# Patient Record
Sex: MALE | Race: OTHER | HISPANIC OR LATINO | URBAN - METROPOLITAN AREA
[De-identification: names, ages, dates, MRNs, and addresses within clinical notes are randomized per-mention and may not be internally consistent; named-entity substitution may affect disease eponyms.]

---

## 2017-11-19 ENCOUNTER — EMERGENCY (EMERGENCY)
Facility: HOSPITAL | Age: 27
LOS: 1 days | Discharge: HOME | End: 2017-11-19
Attending: EMERGENCY MEDICINE | Admitting: EMERGENCY MEDICINE
Payer: SELF-PAY

## 2017-11-19 VITALS
TEMPERATURE: 99 F | SYSTOLIC BLOOD PRESSURE: 127 MMHG | OXYGEN SATURATION: 100 % | HEIGHT: 66 IN | RESPIRATION RATE: 20 BRPM | HEART RATE: 90 BPM | WEIGHT: 169.98 LBS | DIASTOLIC BLOOD PRESSURE: 91 MMHG

## 2017-11-19 PROCEDURE — 99053 MED SERV 10PM-8AM 24 HR FAC: CPT

## 2017-11-19 PROCEDURE — 99284 EMERGENCY DEPT VISIT MOD MDM: CPT | Mod: 25

## 2017-11-19 NOTE — ED ADULT TRIAGE NOTE - CHIEF COMPLAINT QUOTE
Patient presents to ER complaining of chest pain since 1700, reports symptoms X 3 months, reports mild SOB, no PMH.

## 2017-11-20 VITALS
SYSTOLIC BLOOD PRESSURE: 114 MMHG | DIASTOLIC BLOOD PRESSURE: 68 MMHG | OXYGEN SATURATION: 99 % | HEART RATE: 74 BPM | TEMPERATURE: 98 F | RESPIRATION RATE: 20 BRPM

## 2017-11-20 LAB
ALBUMIN SERPL ELPH-MCNC: 4.4 G/DL — SIGNIFICANT CHANGE UP (ref 3.3–5.2)
ALP SERPL-CCNC: 76 U/L — SIGNIFICANT CHANGE UP (ref 40–120)
ALT FLD-CCNC: 59 U/L — HIGH
ANION GAP SERPL CALC-SCNC: 14 MMOL/L — SIGNIFICANT CHANGE UP (ref 5–17)
APPEARANCE UR: CLEAR — SIGNIFICANT CHANGE UP
AST SERPL-CCNC: 36 U/L — SIGNIFICANT CHANGE UP
BASOPHILS # BLD AUTO: 0 K/UL — SIGNIFICANT CHANGE UP (ref 0–0.2)
BASOPHILS NFR BLD AUTO: 0.1 % — SIGNIFICANT CHANGE UP (ref 0–2)
BILIRUB SERPL-MCNC: 0.2 MG/DL — LOW (ref 0.4–2)
BILIRUB UR-MCNC: NEGATIVE — SIGNIFICANT CHANGE UP
BUN SERPL-MCNC: 15 MG/DL — SIGNIFICANT CHANGE UP (ref 8–20)
CALCIUM SERPL-MCNC: 9.2 MG/DL — SIGNIFICANT CHANGE UP (ref 8.6–10.2)
CHLORIDE SERPL-SCNC: 104 MMOL/L — SIGNIFICANT CHANGE UP (ref 98–107)
CO2 SERPL-SCNC: 23 MMOL/L — SIGNIFICANT CHANGE UP (ref 22–29)
COLOR SPEC: YELLOW — SIGNIFICANT CHANGE UP
CREAT SERPL-MCNC: 0.89 MG/DL — SIGNIFICANT CHANGE UP (ref 0.5–1.3)
DIFF PNL FLD: NEGATIVE — SIGNIFICANT CHANGE UP
EOSINOPHIL # BLD AUTO: 0.3 K/UL — SIGNIFICANT CHANGE UP (ref 0–0.5)
EOSINOPHIL NFR BLD AUTO: 3.1 % — SIGNIFICANT CHANGE UP (ref 0–5)
GLUCOSE SERPL-MCNC: 105 MG/DL — SIGNIFICANT CHANGE UP (ref 70–115)
GLUCOSE UR QL: NEGATIVE MG/DL — SIGNIFICANT CHANGE UP
HCT VFR BLD CALC: 43.2 % — SIGNIFICANT CHANGE UP (ref 42–52)
HGB BLD-MCNC: 15.1 G/DL — SIGNIFICANT CHANGE UP (ref 14–18)
KETONES UR-MCNC: NEGATIVE — SIGNIFICANT CHANGE UP
LEUKOCYTE ESTERASE UR-ACNC: NEGATIVE — SIGNIFICANT CHANGE UP
LIDOCAIN IGE QN: 48 U/L — SIGNIFICANT CHANGE UP (ref 22–51)
LYMPHOCYTES # BLD AUTO: 1.8 K/UL — SIGNIFICANT CHANGE UP (ref 1–4.8)
LYMPHOCYTES # BLD AUTO: 21.1 % — SIGNIFICANT CHANGE UP (ref 20–55)
MCHC RBC-ENTMCNC: 30.3 PG — SIGNIFICANT CHANGE UP (ref 27–31)
MCHC RBC-ENTMCNC: 35 G/DL — SIGNIFICANT CHANGE UP (ref 32–36)
MCV RBC AUTO: 86.6 FL — SIGNIFICANT CHANGE UP (ref 80–94)
MONOCYTES # BLD AUTO: 0.6 K/UL — SIGNIFICANT CHANGE UP (ref 0–0.8)
MONOCYTES NFR BLD AUTO: 7.3 % — SIGNIFICANT CHANGE UP (ref 3–10)
NEUTROPHILS # BLD AUTO: 5.7 K/UL — SIGNIFICANT CHANGE UP (ref 1.8–8)
NEUTROPHILS NFR BLD AUTO: 68.2 % — SIGNIFICANT CHANGE UP (ref 37–73)
NITRITE UR-MCNC: NEGATIVE — SIGNIFICANT CHANGE UP
PH UR: 7 — SIGNIFICANT CHANGE UP (ref 5–8)
PLATELET # BLD AUTO: 141 K/UL — LOW (ref 150–400)
POTASSIUM SERPL-MCNC: 4.1 MMOL/L — SIGNIFICANT CHANGE UP (ref 3.5–5.3)
POTASSIUM SERPL-SCNC: 4.1 MMOL/L — SIGNIFICANT CHANGE UP (ref 3.5–5.3)
PROT SERPL-MCNC: 7.5 G/DL — SIGNIFICANT CHANGE UP (ref 6.6–8.7)
PROT UR-MCNC: NEGATIVE MG/DL — SIGNIFICANT CHANGE UP
RBC # BLD: 4.99 M/UL — SIGNIFICANT CHANGE UP (ref 4.6–6.2)
RBC # FLD: 12.7 % — SIGNIFICANT CHANGE UP (ref 11–15.6)
SODIUM SERPL-SCNC: 141 MMOL/L — SIGNIFICANT CHANGE UP (ref 135–145)
SP GR SPEC: 1.01 — SIGNIFICANT CHANGE UP (ref 1.01–1.02)
TROPONIN T SERPL-MCNC: <0.01 NG/ML — SIGNIFICANT CHANGE UP (ref 0–0.06)
UROBILINOGEN FLD QL: NEGATIVE MG/DL — SIGNIFICANT CHANGE UP
WBC # BLD: 8.4 K/UL — SIGNIFICANT CHANGE UP (ref 4.8–10.8)
WBC # FLD AUTO: 8.4 K/UL — SIGNIFICANT CHANGE UP (ref 4.8–10.8)

## 2017-11-20 PROCEDURE — 71046 X-RAY EXAM CHEST 2 VIEWS: CPT

## 2017-11-20 PROCEDURE — T1013: CPT

## 2017-11-20 PROCEDURE — 81003 URINALYSIS AUTO W/O SCOPE: CPT

## 2017-11-20 PROCEDURE — 83690 ASSAY OF LIPASE: CPT

## 2017-11-20 PROCEDURE — 36415 COLL VENOUS BLD VENIPUNCTURE: CPT

## 2017-11-20 PROCEDURE — 71020: CPT | Mod: 26

## 2017-11-20 PROCEDURE — 84484 ASSAY OF TROPONIN QUANT: CPT

## 2017-11-20 PROCEDURE — 96374 THER/PROPH/DIAG INJ IV PUSH: CPT

## 2017-11-20 PROCEDURE — 80053 COMPREHEN METABOLIC PANEL: CPT

## 2017-11-20 PROCEDURE — 99284 EMERGENCY DEPT VISIT MOD MDM: CPT | Mod: 25

## 2017-11-20 PROCEDURE — 85027 COMPLETE CBC AUTOMATED: CPT

## 2017-11-20 RX ORDER — SODIUM CHLORIDE 9 MG/ML
3 INJECTION INTRAMUSCULAR; INTRAVENOUS; SUBCUTANEOUS ONCE
Qty: 0 | Refills: 0 | Status: COMPLETED | OUTPATIENT
Start: 2017-11-20 | End: 2017-11-20

## 2017-11-20 RX ORDER — ONDANSETRON 8 MG/1
4 TABLET, FILM COATED ORAL ONCE
Qty: 0 | Refills: 0 | Status: COMPLETED | OUTPATIENT
Start: 2017-11-20 | End: 2017-11-20

## 2017-11-20 RX ORDER — KETOROLAC TROMETHAMINE 30 MG/ML
15 SYRINGE (ML) INJECTION ONCE
Qty: 0 | Refills: 0 | Status: DISCONTINUED | OUTPATIENT
Start: 2017-11-20 | End: 2017-11-20

## 2017-11-20 RX ORDER — FAMOTIDINE 10 MG/ML
20 INJECTION INTRAVENOUS ONCE
Qty: 0 | Refills: 0 | Status: COMPLETED | OUTPATIENT
Start: 2017-11-20 | End: 2017-11-20

## 2017-11-20 RX ORDER — FAMOTIDINE 10 MG/ML
1 INJECTION INTRAVENOUS
Qty: 30 | Refills: 0 | OUTPATIENT
Start: 2017-11-20 | End: 2017-12-20

## 2017-11-20 RX ADMIN — SODIUM CHLORIDE 3 MILLILITER(S): 9 INJECTION INTRAMUSCULAR; INTRAVENOUS; SUBCUTANEOUS at 01:09

## 2017-11-20 RX ADMIN — Medication 15 MILLIGRAM(S): at 01:45

## 2017-11-20 RX ADMIN — Medication 15 MILLIGRAM(S): at 01:30

## 2017-11-20 NOTE — ED PROVIDER NOTE - OBJECTIVE STATEMENT
A 27 year old male pt presents to the ED c/o CP, vomiting, hematemesis. For the past 3 months the pt has been experiencing left sided CP. 20 days ago the pt began to experience hematemesis and this week he had 3 episodes of hematemesis, which was mostly blood according to the pt. He has not noted any blood in his stool. The pt is a smoker and has no PMHx or PSHx. No further complaints at this time.  used at bedside.

## 2017-11-20 NOTE — ED ADULT NURSE NOTE - OBJECTIVE STATEMENT
Pt receifved in [facility] c/o 3 months of chest discomfort without radiation, mild SOB, abd pain nausea and vomiting. Pt has petechiae on his face from dry heaving and blood shot eyes. Pt currently asymptomatic on assessment. Abd soft nontender to palpation, respirations are even and unlabored with no sx's of SOB noted. Pt denies medical hx. Pt denies fever/chills, diarrhea, cough, dizziness, HA.

## 2017-11-20 NOTE — ED PROVIDER NOTE - PROGRESS NOTE DETAILS
no active bleeding  feels better after gi meds  abd soft/nt  nml h/h after several weeks of symptoms

## 2018-08-17 NOTE — ED PROVIDER NOTE - ENMT, MLM
Airway patent, Nasal mucosa clear. Mouth with normal mucosa. Throat has no vesicles, no oropharyngeal exudates and uvula is midline. normal... well appearing, well nourished, and in no apparent distress.

## 2018-09-20 NOTE — ED PROVIDER NOTE - DURATION
month(s) Concentration Of Solution Injected (Mg/Ml): 4.0 Medical Necessity Clause: This procedure was medically necessary because the lesions that were treated were: Total Volume Injected (Ccs- Only Use Numbers And Decimals): 0.1 Administered By (Optional): viktoriya Consent: The risks of atrophy were reviewed with the patient. Include Z78.9 (Other Specified Conditions Influencing Health Status) As An Associated Diagnosis?: No Detail Level: Zone X Size Of Lesion In Cm (Optional): 0 Kenalog Preparation: Kenalog in bacteriostatic water

## 2020-12-29 NOTE — ED PROVIDER NOTE - MEDICAL DECISION MAKING DETAILS
A 27 year old male pt presents to the ED c/o medical evaluation. Will check labs, CXR, ekg, and re-evaluate.
94

## 2021-03-28 ENCOUNTER — OUTPATIENT (OUTPATIENT)
Dept: OUTPATIENT SERVICES | Facility: HOSPITAL | Age: 31
LOS: 1 days | End: 2021-03-28
Payer: SELF-PAY

## 2021-03-28 DIAGNOSIS — Z20.828 CONTACT WITH AND (SUSPECTED) EXPOSURE TO OTHER VIRAL COMMUNICABLE DISEASES: ICD-10-CM

## 2021-03-28 LAB — SARS-COV-2 RNA SPEC QL NAA+PROBE: SIGNIFICANT CHANGE UP

## 2021-03-28 PROCEDURE — U0003: CPT

## 2021-03-28 PROCEDURE — U0005: CPT

## 2022-10-31 ENCOUNTER — EMERGENCY (EMERGENCY)
Facility: HOSPITAL | Age: 32
LOS: 1 days | Discharge: HOME | End: 2022-10-31
Attending: EMERGENCY MEDICINE
Payer: SELF-PAY

## 2022-10-31 VITALS
HEIGHT: 66 IN | RESPIRATION RATE: 18 BRPM | TEMPERATURE: 98 F | OXYGEN SATURATION: 98 % | SYSTOLIC BLOOD PRESSURE: 122 MMHG | HEART RATE: 84 BPM | DIASTOLIC BLOOD PRESSURE: 80 MMHG

## 2022-10-31 PROCEDURE — 99283 EMERGENCY DEPT VISIT LOW MDM: CPT

## 2022-10-31 RX ORDER — OXYMETAZOLINE HYDROCHLORIDE 0.5 MG/ML
2 SPRAY NASAL EVERY 12 HOURS
Refills: 0 | Status: DISCONTINUED | OUTPATIENT
Start: 2022-10-31 | End: 2022-11-07

## 2022-10-31 RX ADMIN — OXYMETAZOLINE HYDROCHLORIDE 2 SPRAY(S): 0.5 SPRAY NASAL at 10:28

## 2022-10-31 RX ADMIN — Medication 1 TABLET(S): at 10:14

## 2022-10-31 NOTE — ED PROVIDER NOTE - NSFOLLOWUPINSTRUCTIONS_ED_ALL_ED_FT
llame y carolin un seguimiento con la clínica dental [place] y también con Ent. ada antibiótico  use el aerosol nasal solo x 3 días  Sinusitis, en adultos    Sinusitis, Adult       La sinusitis es la inflamación de los senos paranasales. Los senos paranasales son espacios vacíos en los huesos alrededor del abebe. Los senos paranasales se encuentran en estos lugares:  •Alrededor de los ojos.      •En la mitad de la frente.      •Detrás de la nariz.      •En los pómulos.      Normalmente, la mucosidad drena a través de los senos. Cuando los tejidos nasales se inflaman o hinchan, la mucosidad puede quedar atrapada o bloqueada. Granite fomenta la proliferación de bacterias, virus y hongos, lo que produce infecciones. La mayoría de las infecciones de los senos paranasales son provocadas por un virus.    La sinusitis puede desarrollarse rápidamente. Puede durar hasta 4 semanas (aguda) o más de 12 semanas (crónica). A menudo, la sinusitis surge después de un resfriado.      ¿Cuáles son las causas?    Esta afección es causada por cualquier sustancia que inflame los senos o evite que la mucosidad drene. Granite puede comprender lo siguiente:  •Alergias.      •Asma.      •Infección por bacterias o virus.      •Deformidades u obstrucciones en la nariz o los senos paranasales.      •Crecimientos anormales en la nariz (pólipos nasales).      •Agentes contaminantes, diego sustancias químicas o irritantes presentes en el aire.      •Infección por hongos (poco frecuentes).        ¿Qué incrementa el riesgo?    Es más probable que tenga esta afección si:  •Tienen debilitado el sistema de defensa del organismo (sistema inmunitario).      •Nada o bucea mucho.      •Abusa de los aerosoles nasales.      •Fuma.        ¿Cuáles son los signos o los síntomas?    Los principales síntomas de esta afección son dolor y sensación de presión alrededor de los senos paranasales afectados. Otros síntomas pueden incluir los siguientes:  •Nariz tapada o congestión nasal.      •Drenaje de mucosidad espesa que sale de la nariz.      •Hinchazón y calor en los senos paranasales afectados.      •Dolor de angelina.      •Dolor en los dientes superiores.      •Tos que puede empeorar por la noche.      •Mucosidad excesiva que se acumula en la garganta o la parte posterior de la nariz (goteo posnasal).      •Disminución del sentido del olfato y del gusto.      •Fatiga.      •Fiebre.      •Dolor de garganta.      •Mal aliento.        ¿Cómo se diagnostica?    Esta afección se diagnostica en función de lo siguiente:  •Casandra síntomas.      •Casandra antecedentes médicos.      •Un examen físico.    •Pruebas para averiguar si la afección es aguda o crónica. Estas pueden incluir:  •Revisar la nariz para bladimir si tiene pólipos nasales.      •Observar los senos paranasales con un dispositivo que tiene nikhil rommel (endoscopio).      •Hacer pruebas para detectar alergias o bacterias.      •Pruebas de diagnóstico por imágenes, diego resonancia magnética (RM) o nikhil exploración por tomografía computarizada (TC).        En contadas ocasiones, se puede realizar nikhil biopsia de hueso para descartar tipos más graves de infecciones por hongos en los senos paranasales.      ¿Cómo se trata?    El tratamiento para la sinusitis depende de la causa y de si la afección es crónica o aguda.•Si la causa es un virus, los síntomas deberían desaparecer solos en el término de 10 días. Pueden darle medicamentos para aliviar los síntomas. Entre ellos, se incluyen los siguientes:  •Medicamentos para encoger las fosas nasales hinchadas (descongestivos intranasales tópicos).       •Medicamentos para tratar alergias (antihistamínicos).      •Un aerosol que radha la inflamación de las fosas nasales (corticoesteroide intranasal tópico).      •Enjuagues que ayudan a eliminar la mucosidad espesa de la nariz (lavados con solución salina nasal).      •Si la causa son bacterias, el médico puede recomendarle que espere para bladimir si los síntomas mejoran. La mayoría de las infecciones bacterianas mejoran sin medicamentos antibióticos. Posiblemente le den antibióticos si usted tiene:  •Nikhil infección grave.       •El sistema inmunitario debilitado.        •Si la causa es un estrechamiento de las fosas nasales o la presencia de pólipos nasales, es posible que necesite nikhil cirugía.        Siga estas indicaciones en robison casa:    Medicamentos     •Readlyn, use o aplíquese los medicamentos de venta juanito y recetados solamente diego se lo haya indicado el médico. Estos pueden incluir aerosoles nasales.      •Si le recetaron un antibiótico, tómelo diego se lo haya indicado el médico. No deje de ada los antibióticos aunque comience a sentirse mejor.        Hidrátese y humidifique los ambientes      •Melonie suficiente líquido diego para mantener la orina de color amarillo pálido. Mantenerse hidratado lo ayudará a diluir la mucosidad.      •Use un humidificador de vapor frío para mantener la humedad de robison hogar por encima del 50 %.      •Realice inhalaciones de vapor por 10 a 15 minutos, de 3 a 4 veces al día, o diego se lo haya indicado el médico. Puede hacer esto en el baño con el vapor del [place] de la ducha.      •Limite la exposición al aire frío o seco.      Reposo     •Descanse todo lo que pueda.      •Duerma con la angelina levantada (elevada).      •Asegúrese de dormir lo suficiente cada noche.        Indicaciones generales      •Aplíquese un paño tibio y húmedo en la anyi 3 o 4 veces al día o diego se lo haya indicado el médico. Granite ayuda a calmar las molestias.      •Lávese las giuliana frecuentemente con agua y jabón para reducir la exposición a los gérmenes. Use desinfectante para giuliana si no dispone de agua y jabón.      • No fume. Evite estar cerca de personas que fuman (fumador pasivo).      •Concurra a todas las visitas de seguimiento diego se lo haya indicado el médico. Granite es importante.        Comuníquese con un médico si:    •Tiene fiebre.      •Casandra síntomas empeoran.      •Los síntomas no mejoran en el término de 10 días.        Solicite ayuda inmediatamente si:    •Tiene un dolor de angelina intenso.      •Tiene vómitos persistentes.      •Tiene dolor intenso o hinchazón en la emilee del abebe o los ojos.      •Tiene problemas de visión.      •Presenta confusión.      •Tiene el veronica rígido.      •Tiene dificultad para respirar.        Resumen    •La sinusitis es el dolor y la inflamación de los senos paranasales. Los senos paranasales son espacios vacíos en los huesos alrededor del abebe.      •La causa de esta afección es la inflamación o hinchazón de los tejidos nasales. La hinchazón atrapa u obstruye el flujo de la mucosidad. Granite fomenta la proliferación de bacterias, virus y hongos, lo que produce infecciones.      •Si le recetaron un antibiótico, tómelo diego se lo haya indicado el médico. No deje de ada los antibióticos, aunque comience a sentirse mejor.      •Concurra a todas las visitas de seguimiento diego se lo haya indicado el médico. Granite es importante.      Esta información no tiene diego fin reemplazar el consejo del médico. Asegúrese de hacerle al médico cualquier pregunta que tenga.

## 2022-10-31 NOTE — ED ADULT TRIAGE NOTE - CHIEF COMPLAINT QUOTE
pt states he has left side nasal congestion, fever & had some bleeding from left nare x 22 days  A&Ox3, resp wnl

## 2022-10-31 NOTE — ED PROVIDER NOTE - OBJECTIVE STATEMENT
32 y,o male No Sig pmh presents in Er and c.o sinus pain - congestion and pus drainage for 3 weeks. pt states he has nose bleed on and off x 22 days mostly morning and will stop by itself and follow with pus . pt also admit fever as well 8 dyas ago as well did not take temp . pt did not take any med since then , he works in  shop. denies any trauma to face or nose -dizziness or hx of prolonged bleeding  in family. he is smoker 2-3 cigaret / day

## 2022-10-31 NOTE — ED PROVIDER NOTE - ATTENDING APP SHARED VISIT CONTRIBUTION OF CARE
: matthew  reports nasal congestion, left sinus pain, malodorous purulent nasal discharge with blood for 20 days.  Feeling feverish since tuesday.  Denies sore throat, denies diff swallowing.  denies diff breathing.  Not taking any medications.  Smokes.  PE:  nontoxic appearing, NAD, no facial swelling, mild inflammation of nasal turbinates pamella without obvious discharge, left maxillary sinus percussion tenderness, normal oropharynx, neck supple/ no meningismus. A/P  nasal discharge for 20 days: empiric treatment for sinusitis and advised follow-up with ENT

## 2022-10-31 NOTE — ED PROVIDER NOTE - ENMT, MLM
Airway patent, Nasal mucosa clear. Mouth with normal mucosa. Throat has no vesicles, no oropharyngeal exudates and uvula is midline.   no active nose bleeding  on exam - pharynx clear - no nasal bone / brige tTP- left side of the frontal and maxillary mild swollen and TTP Airway patent, Nasal mucosa clear. Mouth with normal mucosa. Throat has no vesicles, no oropharyngeal exudates and uvula is midline.   no active nose bleeding  on exam - pharynx clear - no nasal bone / bridge tTP- left side of the frontal and maxillary mild swollen and TTP- dental caaries noted on the left upper jaw

## 2022-10-31 NOTE — ED PROVIDER NOTE - NS ED ATTENDING STATEMENT MOD
This was a shared visit with the HA. I reviewed and verified the documentation and independently performed the documented:

## 2022-10-31 NOTE — ED PROVIDER NOTE - CARE PROVIDER_API CALL
Duane Osborne)  [facility]  [address] [place] [id number]  Phone: [phone]  Fax: [phone]  Follow Up Time:

## 2022-10-31 NOTE — ED PROVIDER NOTE - PATIENT PORTAL LINK FT
You can access the FollowMyHealth Patient Portal offered by [facility] by registering at the following website: [email or web address]. By joining [facility]’s FollowMyHealth portal, you will also be able to view your health information using other applications (apps) compatible with our system.

## 2024-09-25 ENCOUNTER — EMERGENCY (EMERGENCY)
Facility: HOSPITAL | Age: 34
LOS: 1 days | Discharge: HOME | End: 2024-09-25
Attending: EMERGENCY MEDICINE
Payer: MEDICAID

## 2024-09-25 VITALS
TEMPERATURE: 99 F | HEART RATE: 77 BPM | DIASTOLIC BLOOD PRESSURE: 73 MMHG | SYSTOLIC BLOOD PRESSURE: 138 MMHG | RESPIRATION RATE: 18 BRPM | OXYGEN SATURATION: 96 % | HEIGHT: 66 IN | WEIGHT: 184.97 LBS

## 2024-09-25 LAB
ALBUMIN SERPL ELPH-MCNC: 4.4 G/DL — SIGNIFICANT CHANGE UP (ref 3.3–5.2)
ALP SERPL-CCNC: 78 U/L — SIGNIFICANT CHANGE UP (ref 40–120)
ALT FLD-CCNC: 44 U/L — HIGH
ANION GAP SERPL CALC-SCNC: 14 MMOL/L — SIGNIFICANT CHANGE UP (ref 5–17)
AST SERPL-CCNC: 29 U/L — SIGNIFICANT CHANGE UP
BASOPHILS # BLD AUTO: 0.01 K/UL — SIGNIFICANT CHANGE UP (ref 0–0.2)
BASOPHILS NFR BLD AUTO: 0.1 % — SIGNIFICANT CHANGE UP (ref 0–2)
BILIRUB SERPL-MCNC: 0.6 MG/DL — SIGNIFICANT CHANGE UP (ref 0.4–2)
BUN SERPL-MCNC: 12.6 MG/DL — SIGNIFICANT CHANGE UP (ref 8–20)
CALCIUM SERPL-MCNC: 9.2 MG/DL — SIGNIFICANT CHANGE UP (ref 8.4–10.5)
CHLORIDE SERPL-SCNC: 103 MMOL/L — SIGNIFICANT CHANGE UP (ref 96–108)
CO2 SERPL-SCNC: 22 MMOL/L — SIGNIFICANT CHANGE UP (ref 22–29)
CREAT SERPL-MCNC: 0.82 MG/DL — SIGNIFICANT CHANGE UP (ref 0.5–1.3)
EGFR: 117 ML/MIN/1.73M2 — SIGNIFICANT CHANGE UP
EOSINOPHIL # BLD AUTO: 0.05 K/UL — SIGNIFICANT CHANGE UP (ref 0–0.5)
EOSINOPHIL NFR BLD AUTO: 0.7 % — SIGNIFICANT CHANGE UP (ref 0–6)
GLUCOSE SERPL-MCNC: 110 MG/DL — HIGH (ref 70–99)
HCT VFR BLD CALC: 42.6 % — SIGNIFICANT CHANGE UP (ref 39–50)
HGB BLD-MCNC: 15 G/DL — SIGNIFICANT CHANGE UP (ref 13–17)
IMM GRANULOCYTES NFR BLD AUTO: 0.5 % — SIGNIFICANT CHANGE UP (ref 0–0.9)
LIDOCAIN IGE QN: 35 U/L — SIGNIFICANT CHANGE UP (ref 22–51)
LYMPHOCYTES # BLD AUTO: 1.49 K/UL — SIGNIFICANT CHANGE UP (ref 1–3.3)
LYMPHOCYTES # BLD AUTO: 19.8 % — SIGNIFICANT CHANGE UP (ref 13–44)
MCHC RBC-ENTMCNC: 29.5 PG — SIGNIFICANT CHANGE UP (ref 27–34)
MCHC RBC-ENTMCNC: 35.2 GM/DL — SIGNIFICANT CHANGE UP (ref 32–36)
MCV RBC AUTO: 83.9 FL — SIGNIFICANT CHANGE UP (ref 80–100)
MONOCYTES # BLD AUTO: 0.61 K/UL — SIGNIFICANT CHANGE UP (ref 0–0.9)
MONOCYTES NFR BLD AUTO: 8.1 % — SIGNIFICANT CHANGE UP (ref 2–14)
NEUTROPHILS # BLD AUTO: 5.32 K/UL — SIGNIFICANT CHANGE UP (ref 1.8–7.4)
NEUTROPHILS NFR BLD AUTO: 70.8 % — SIGNIFICANT CHANGE UP (ref 43–77)
PLATELET # BLD AUTO: 167 K/UL — SIGNIFICANT CHANGE UP (ref 150–400)
POTASSIUM SERPL-MCNC: 3.9 MMOL/L — SIGNIFICANT CHANGE UP (ref 3.5–5.3)
POTASSIUM SERPL-SCNC: 3.9 MMOL/L — SIGNIFICANT CHANGE UP (ref 3.5–5.3)
PROT SERPL-MCNC: 7.4 G/DL — SIGNIFICANT CHANGE UP (ref 6.6–8.7)
RBC # BLD: 5.08 M/UL — SIGNIFICANT CHANGE UP (ref 4.2–5.8)
RBC # FLD: 12.1 % — SIGNIFICANT CHANGE UP (ref 10.3–14.5)
SODIUM SERPL-SCNC: 139 MMOL/L — SIGNIFICANT CHANGE UP (ref 135–145)
TROPONIN T, HIGH SENSITIVITY RESULT: 6 NG/L — SIGNIFICANT CHANGE UP (ref 0–51)
WBC # BLD: 7.52 K/UL — SIGNIFICANT CHANGE UP (ref 3.8–10.5)
WBC # FLD AUTO: 7.52 K/UL — SIGNIFICANT CHANGE UP (ref 3.8–10.5)

## 2024-09-25 PROCEDURE — 36415 COLL VENOUS BLD VENIPUNCTURE: CPT

## 2024-09-25 PROCEDURE — 71046 X-RAY EXAM CHEST 2 VIEWS: CPT

## 2024-09-25 PROCEDURE — 84484 ASSAY OF TROPONIN QUANT: CPT

## 2024-09-25 PROCEDURE — 80053 COMPREHEN METABOLIC PANEL: CPT

## 2024-09-25 PROCEDURE — 99285 EMERGENCY DEPT VISIT HI MDM: CPT

## 2024-09-25 PROCEDURE — 93010 ELECTROCARDIOGRAM REPORT: CPT

## 2024-09-25 PROCEDURE — T1013: CPT

## 2024-09-25 PROCEDURE — 93005 ELECTROCARDIOGRAM TRACING: CPT

## 2024-09-25 PROCEDURE — 83690 ASSAY OF LIPASE: CPT

## 2024-09-25 PROCEDURE — 96374 THER/PROPH/DIAG INJ IV PUSH: CPT

## 2024-09-25 PROCEDURE — 85025 COMPLETE CBC W/AUTO DIFF WBC: CPT

## 2024-09-25 PROCEDURE — 99285 EMERGENCY DEPT VISIT HI MDM: CPT | Mod: 25

## 2024-09-25 PROCEDURE — 71046 X-RAY EXAM CHEST 2 VIEWS: CPT | Mod: 26

## 2024-09-25 PROCEDURE — 96375 TX/PRO/DX INJ NEW DRUG ADDON: CPT

## 2024-09-25 RX ORDER — PANTOPRAZOLE SODIUM 40 MG
1 TABLET, DELAYED RELEASE (ENTERIC COATED) ORAL
Qty: 14 | Refills: 0
Start: 2024-09-25 | End: 2024-10-08

## 2024-09-25 RX ORDER — FAMOTIDINE 10 MG/ML
20 INJECTION INTRAVENOUS ONCE
Refills: 0 | Status: COMPLETED | OUTPATIENT
Start: 2024-09-25 | End: 2024-09-25

## 2024-09-25 RX ORDER — ACETAMINOPHEN 325 MG/1
1000 TABLET ORAL ONCE
Refills: 0 | Status: COMPLETED | OUTPATIENT
Start: 2024-09-25 | End: 2024-09-25

## 2024-09-25 RX ADMIN — FAMOTIDINE 20 MILLIGRAM(S): 10 INJECTION INTRAVENOUS at 12:25

## 2024-09-25 RX ADMIN — ACETAMINOPHEN 400 MILLIGRAM(S): 325 TABLET ORAL at 12:25

## 2024-09-25 NOTE — ED PROVIDER NOTE - ATTENDING APP SHARED VISIT CONTRIBUTION OF CARE
I, Bryan Camp MD, performed the initial face to face bedside interview with this patient regarding history of present illness, review of symptoms and relevant past medical, social and family history.  I completed an independent physical examination.  I was the initial provider who evaluated this patient. I have signed out the follow up of any pending tests (i.e. labs, radiological studies) to the ACP.  I have communicated the patient’s plan of care and disposition with the ACP.    34 year old male no PMHx c/o abdominal pain. PE unremarkable. GERD. supportive care.

## 2024-09-25 NOTE — ED PROVIDER NOTE - NSFOLLOWUPINSTRUCTIONS_ED_ALL_ED_FT
Abdominal Pain    Many things can cause abdominal pain. Many times, abdominal pain is not caused by a disease and will improve without treatment. Your health care provider will do a physical exam to determine if there is a dangerous cause of your pain; blood tests and imaging may help determine the cause of your pain. However, in many cases, no cause may be found and you may need further testing as an outpatient. Monitor your abdominal pain for any changes.     SEEK IMMEDIATE MEDICAL CARE IF YOU HAVE ANY OF THE FOLLOWING SYMPTOMS: worsening abdominal pain, uncontrollable vomiting, profuse diarrhea, inability to have bowel movements or pass gas, black or bloody stools, fever accompanying chest pain or back pain, or fainting. These symptoms may represent a serious problem that is an emergency. Do not wait to see if the symptoms will go away. Get medical help right away. Call 911 and do not drive yourself to the hospital.     Chest Pain    Chest pain can be caused by many different conditions which may or may not be dangerous. Causes include heartburn, lung infections, heart attack, blood clot in lungs, skin infections, strain or damage to muscle, cartilage, or bones, etc. In addition to a history and physical examination, an electrocardiogram (ECG) or other lab tests may have been performed to determine the cause of your chest pain. Follow up with your primary care provider or with a cardiologist as instructed.     SEEK IMMEDIATE MEDICAL CARE IF YOU HAVE ANY OF THE FOLLOWING SYMPTOMS: worsening chest pain, coughing up blood, unexplained back/neck/jaw pain, severe abdominal pain, dizziness or lightheadedness, fainting, shortness of breath, sweaty or clammy skin, vomiting, or racing heart beat. These symptoms may represent a serious problem that is an emergency. Do not wait to see if the symptoms will go away. Get medical help right away. Call 911 and do not drive yourself to the hospital.

## 2024-09-25 NOTE — ED ADULT TRIAGE NOTE - CHIEF COMPLAINT QUOTE
pt c/o midsternum chest pain and headache. pain worse with inhalation. denies smoking hx, cardiac hx, or medical hx.

## 2024-09-25 NOTE — ED PROVIDER NOTE - OBJECTIVE STATEMENT
35 y/o male with no sign medical history presents to the ED c/o epigastric pain and chest discomfort since this morning. Noters pain started around 4 am in the epigastric region radiating to his chest. Associated sob. No family history of early mi, stroke. No risk factors. No hsitory of this in the past.

## 2024-09-25 NOTE — ED PROVIDER NOTE - CLINICAL SUMMARY MEDICAL DECISION MAKING FREE TEXT BOX
35 y/o male with no sign medical history presents to the ED c/o epigastric pain and chest discomfort since this morning. labs stable chest xray clear ekg nsr at 84, no stemi, likely gerd like symptoms, Pt reassessed, pt feeling better at this time, vss, pt able to walk, talk and vocalized plan of action. Discussed in depth and explained to pt in depth the next steps that need to be taking including proper follow up with PCP or specialists. All incidental findings were discussed with pt as well. Pt verbalized their concerns and all questions were answered. Pt understands dispo and wants discharge. Given good instructions when to return to ED and importance of f/u.     - Syl

## 2024-09-25 NOTE — ED PROVIDER NOTE - PATIENT PORTAL LINK FT
You can access the FollowMyHealth Patient Portal offered by [facility] by registering at the following website: [email or web address]. By joining [facility]’s FollowMyHealth portal, you will also be able to view your health information using other applications (apps) compatible with our system.

## 2024-09-25 NOTE — ED ADULT NURSE NOTE - OBJECTIVE STATEMENT
pt AOx4, breathing even and unlabored. assumed care 1209. pt presents to ED c/o midsternum chest pain + HA. pain worse with inhalation. denies smoking, no PMH. labs sent, meds given, XR completed. pt received on CCM per order.